# Patient Record
Sex: FEMALE | Race: WHITE | NOT HISPANIC OR LATINO | Employment: OTHER | URBAN - METROPOLITAN AREA
[De-identification: names, ages, dates, MRNs, and addresses within clinical notes are randomized per-mention and may not be internally consistent; named-entity substitution may affect disease eponyms.]

---

## 2021-05-03 ENCOUNTER — TRANSFERRED RECORDS (OUTPATIENT)
Dept: HEALTH INFORMATION MANAGEMENT | Facility: CLINIC | Age: 36
End: 2021-05-03

## 2021-06-01 ENCOUNTER — TRANSFERRED RECORDS (OUTPATIENT)
Dept: HEALTH INFORMATION MANAGEMENT | Facility: CLINIC | Age: 36
End: 2021-06-01

## 2021-06-02 ENCOUNTER — TELEPHONE (OUTPATIENT)
Dept: ENDOCRINOLOGY | Facility: CLINIC | Age: 36
End: 2021-06-02

## 2021-06-02 NOTE — TELEPHONE ENCOUNTER
[facility] Call Center    Phone Message    May a detailed message be left on voicemail: no     Reason for Call: Other: Patient has new diagnosis of hypothyroidism. Scheduled first avail on 08/16/2021 with Dr. Agudelo.  Patient will be sending records over from Dr. David.  Patient needs sooner appointment.  Please advise. Thank you.     Action Taken: Message routed to:  Adult Clinics: Endocrinology p [phone]    Travel Screening: Not Applicable

## 2021-06-08 NOTE — TELEPHONE ENCOUNTER
Patient called back and confirmed that she is not pregnant but she is post partum 3 months. Patient notes that her TSH was most recently 88.6 and 1 month before that it was 0.1. Patient states that she was increased to levothyroxine 50 mcg daily most recently.     Patient states that she was on levothyroxine throughout her whole pregnancy. Patient notes that she was diagnosed with hypothyroidism before she was pregnant. She notes that her TSH is all over the place and she was told she needs to get in ASAP.    Writer again checked [place]. No appointments in near future.     We are still awaiting records and unable to send to triage provider for review until records received.     Message was sent to referral team to obtain records and patient is also going to call [facility] today.      Purnima Bailey RN  Endocrine Care Coordinator  [facility]

## 2021-06-08 NOTE — TELEPHONE ENCOUNTER
Received records from [facility], records were faxed to [facility] for urgency review. There are no open appointments in the near future for Massiel Kaur.    Dunia Stuart, [facility]  Adult Endocrinology  Formerly [facility]

## 2021-06-08 NOTE — TELEPHONE ENCOUNTER
Clinic is still awaiting records. Message sent to referral team to obtain records. Appointment note says records will be coming from [facility] but no notation if patient is pregnant or not. Writer attempted to contact patient. No answer. Left voicemail for patient to contact our office.       Purnima Bailey RN  Endocrine Care Coordinator  [facility]

## 2021-06-08 NOTE — TELEPHONE ENCOUNTER
Writer received note from referral team that they do not obtain records. Writer called [facility] at [phone]. Writer requested that patient records and referral be faxed ASAP. Per [facility] staff, they will get referral and records faxed to clinic ASAP.      Purnima Bailey RN  Endocrine Care Coordinator  [facility]

## 2021-06-09 NOTE — TELEPHONE ENCOUNTER
CLINIC COORDINATOR SCHEDULING NOTES    CALL RESULT: LVM    APPT TYPE: VIDEO VISIT NEW / UMP NEW ENDOCRINE    PROVIDER: any provider accepting new endocrine patients    DATE APPT NEEDED: within the week    ADDITIONAL NOTES: OK to use any AMI/new 60 minute slot. Option on Saturday with  MED ENDO PROVIDER. Message Tona on Teams from 7-3 for help if needed. Only video visits available soon.

## 2021-06-09 NOTE — TELEPHONE ENCOUNTER
To schedulers : please schedule with consult service (or open new/MAI) within the week. This could be a virtual visit.      Sumi Foote MD  Endocrine triage

## 2021-06-10 NOTE — TELEPHONE ENCOUNTER
RECORDS RECEIVED FROM: external   DATE RECEIVED: 6.15.21    NOTES (FOR ALL VISITS) STATUS DETAILS   OFFICE NOTES from referring provider External  Dr. David-- [facility]   OFFICE NOTES from other specialist External  5.3.21 [facility]      ED NOTES na    OPERATIVE REPORT  (thyroid, pituitary, adrenal, parathyroid) na    MEDICATION LIST External  [facility]    IMAGING      DEXASCAN na    MRI (BRAIN) na    XR (Chest) na    CT (HEAD/NECK/CHEST/ABDOMEN) na    NUCLEAR  na    ULTRASOUND (HEAD/NECK) na    LABS     DIABETES: HBGA1C, CREATININE, FASTING LIPIDS, MICROALBUMIN URINE, POTASSIUM, TSH, T4    THYROID: TSH, T4, CBC, THYRODLONULIN, TOTAL T3, FREE T4, CALCITONIN, CEA Received/ce Cbc- 2.19.21   TSH/T4- 6.1.21   THYROID CASCADE 1.4.21   VITAMIN D 1.4.21

## 2021-06-14 RX ORDER — VITAMIN A ACETATE, .BETA.-CAROTENE, ASCORBIC ACID, CHOLECALCIFEROL, .ALPHA.-TOCOPHEROL ACETATE, DL-, THIAMINE MONONITRATE, RIBOFLAVIN, NIACINAMIDE, PYRIDOXINE HYDROCHLORIDE, FOLIC ACID, CYANOCOBALAMIN, CALCIUM CARBONATE, FERROUS FUMARATE, ZINC OXIDE, AND CUPRIC OXIDE 2000; 2000; 120; 400; 22; 1.84; 3; 20; 10; 1; 12; 200; 27; 25; 2 [IU]/1; [IU]/1; MG/1; [IU]/1; MG/1; MG/1; MG/1; MG/1; MG/1; MG/1; UG/1; MG/1; MG/1; MG/1; MG/1
1 TABLET ORAL
COMMUNITY

## 2021-06-14 RX ORDER — OXYCODONE AND ACETAMINOPHEN 5; 325 MG/1; MG/1
1 TABLET ORAL
COMMUNITY
Start: 2021-02-20

## 2021-06-14 RX ORDER — FLUTICASONE PROPIONATE 50 MCG
2 SPRAY, SUSPENSION (ML) NASAL
COMMUNITY

## 2021-06-14 RX ORDER — FERROUS SULFATE 325(65) MG
325 TABLET ORAL
COMMUNITY

## 2021-06-14 RX ORDER — IBUPROFEN 600 MG/1
600 TABLET, FILM COATED ORAL EVERY 6 HOURS
COMMUNITY
Start: 2021-02-20

## 2021-06-14 RX ORDER — SPIRONOLACT/HYDROCHLOROTHIAZID 25 MG-25MG
1 TABLET ORAL
COMMUNITY

## 2021-06-14 RX ORDER — CETIRIZINE HYDROCHLORIDE 10 MG/1
10 TABLET ORAL
COMMUNITY

## 2021-06-14 RX ORDER — SWAB
1 SWAB, NON-MEDICATED MISCELLANEOUS
COMMUNITY
Start: 2021-02-20

## 2021-06-14 RX ORDER — LEVOTHYROXINE SODIUM 50 UG/1
50 TABLET ORAL
COMMUNITY
End: 2021-06-15 | Stop reason: ALTCHOICE

## 2021-06-14 NOTE — PROGRESS NOTES
Hypothyroidism  Dinesh Ambrose CMA    Yamel is a 35 year old who is being evaluated via a billable video visit.      How would you like to obtain your AVS? MyChart  If the video visit is dropped, the invitation should be resent by: Text to cell phone: [phone]  Will anyone else be joining your video visit? No       [facility]ology [facility]      Yamel Valle is a 36 year old female who is being evaluated via a billable video visit.        Video Start Time: 9:30am  Video End Time: 10:05am    Yamel Valle complains of  chief complaint    Yamel Valle is a 35 year old female who is being evaluated via a billable video visit.        End[facility]inology [facility]    Consulting provider: Referred Self, MD  No address on file    Reason for consultation: Hypothyroidism    HPI:   Yamel Valle is a 35 year old female coming in in regards to abnormal thyroid function.  The patient was diagnosed with hypothyroidism around 2014.  She had been on and off levothyroxine since then.  The patient recently delivered her fifth child in end of February 2021.  During the last pregnancy she has been on 25 or 50 mcg of levothyroxine and at times also not on thyroid levothyroxine.  6 weeks postpartum TSH was suppressed with elevated free T4 at 2 while taking levothyroxine 50 mcg daily.  Levothyroxine was decreased to 25 mcg and follow-up labs still revealed a low TSH and slightly elevated free T4 on my May 3, 2021.  Upon this levothyroxine was discontinued.  Recheck of labs drawn first revealed a TSH of 88  IU with low free T4.    The patient after delivery felt more tired, however her and baby girl had not been sleeping well.  The patient failed quite well in May, however since June she has been exceedingly tired, felt bloated, cold hands cold feet.  She has lots of headaches.  She feels swollen.    Patient weight is 1 35 pounds, just slightly above her prepregnancy weight.    Patient denies heart racing  palpitations, no skin changes in hair or skin.  No tremors.    Delivery 2/19/21 birth baby girl    Been on levothyroxine patient takes levothyroxine in the morning with water, no food for 30 minutes, takes prenatal vitamins dinnertime    Family planning patient does not plan for affect pregnancy at this point    FH mother m and p aunts hypothyroid and on with hyperthyrodism      Current Problem List:   There is no problem list on file for this patient.        Assessment:  Middle age woman with thyrotoxicosis post partum and now elevated TSH and low free T4 consistent with post partum thyroiditis followed by hypothyroidism.  As TSH is quite elevated, correction to euthyroid state unlikely.  Would increase levothyroxine to 75 mcg daily, follow closely this TSH in 2 weeks to assure trend this going well, and in 6 weeks from now.  Follow-up with me thereafter    Plan:   Increase levothyroxine from 50 to 75 mcg daily  Blood work in 2 weeks for TSH and reflex free T4  Will contact patient's via [employer] and adjust as indicated  Repeat TSH in 6 weeks and follow-up with me thereafter    40 minutes spent on the date of the encounter doing chart review, review of test results, interpretation of tests, patient visit and documentation      Criselda Sharma MD  Endocrinology and Diabetes    Telephone contact:  [facility] [place] [phone]  [facility] [phone]        Past Medical and Past Surgical History:  No past medical history on file.    No past surgical history on file.    Medications:   Current Outpatient Medications   Medication Sig Dispense Refill     enoxaparin ANTICOAGULANT (LOVENOX) 60 MG/0.6ML syringe inject (1MG/KG)  by subcutaneous route  every 12 hours       enoxaparin ANTICOAGULANT (LOVENOX) 80 MG/0.8ML syringe Inject 80 mg Subcutaneous every 12 hours       ibuprofen (ADVIL/MOTRIN) 600 MG tablet Take 600 mg by mouth every 6 hours        oxyCODONE-acetaminophen (PERCOCET) 5-325 MG tablet Take 1 tablet by mouth       Prenatal Vit-Fe Fumarate-FA (PRENATAL MULTIVITAMIN  WITH IRON) 28-0.8 MG TABS Take 1 tablet by mouth       cetirizine (ZYRTEC) 10 MG tablet Take 10 mg by mouth       cholecalciferol 25 MCG (1000 UT) TABS Take 1,000 Units by mouth       ferrous sulfate (FEROSUL) 325 (65 Fe) MG tablet Take 325 mg by mouth       fluticasone (FLONASE) 50 MCG/ACT nasal spray 2 sprays       levothyroxine (SYNTHROID/LEVOTHROID) 50 MCG tablet Take 50 mcg by mouth       Multiple Vitamins-Minerals (VITRUM SENIOR) TABS Take 1 tablet by mouth       Prenatal Vit-Fe Fumarate-FA (PNV PRENATAL PLUS MULTIVITAMIN) 27-1 MG TABS per tablet Take 1 tablet by mouth         Allergies:   Not on File    Social History     Tobacco Use     Smoking status: Not on file   Substance Use Topics     Alcohol use: Not on file       No family history on file.    Review of Systems:  GENERAL: Negative  SKIN: Negative  HENT: Negative   EYE: Negative  HEART: Negative  RESPIRATORY: Negative   GI: Negative  : Negative  MSK: Negative  BLOOD/LYMPH: Negative  NEUROLOGIC: Negative   PSYCH: Negative    Physical Examination:  There were no vitals taken for this visit.  There is no height or weight on file to calculate BMI.    Wt Readings from Last 6 Encounters:   No data found for Wt     135 lbs lst, within 10 lbs     Reported vitals:  There were no vitals taken for this visit.   healthy, alert and no distress  PSYCH: Alert and oriented times 3; coherent speech, normal   rate and volume, able to articulate logical thoughts, able   to abstract reason, no tangential thoughts, no hallucinations   or delusions  Her affect is normal and pleasant  RESP: No cough, no audible wheezing, able to talk in full sentences  Remainder of exam unable to be completed due to telephone visits     Labs and Studies:   [facility]  6/1/21 TSH 88 freeT4 0.3  5/3/21 TSH <0.010 free T4 1.19  4/5/21 TSH <0.010 free  T4 2.29  1/4/21 TSH 2.46  11/9/20 TSH 1.72 free T4 0.91  10/12/20 TSH 2.5 free T4 0.96    12/7/20 VitB12 410 VitD 17 Ferritin 12  12/7/20 comp panel Creat 0.5 gluc 122 alb 3.0 remainder nl  CBC diff mostly nl  1/4/21 25OHVitD 32 Ferritin 20

## 2021-06-15 ENCOUNTER — VIRTUAL VISIT (OUTPATIENT)
Dept: ENDOCRINOLOGY | Facility: CLINIC | Age: 36
End: 2021-06-15
Payer: COMMERCIAL

## 2021-06-15 ENCOUNTER — PRE VISIT (OUTPATIENT)
Dept: ENDOCRINOLOGY | Facility: CLINIC | Age: 36
End: 2021-06-15

## 2021-06-15 DIAGNOSIS — E06.3 CHRONIC LYMPHOCYTIC THYROIDITIS: ICD-10-CM

## 2021-06-15 DIAGNOSIS — E06.3 HASHIMOTO'S THYROIDITIS: Primary | ICD-10-CM

## 2021-06-15 DIAGNOSIS — E06.3 HYPOTHYROIDISM DUE TO HASHIMOTO'S THYROIDITIS: ICD-10-CM

## 2021-06-15 PROCEDURE — 99203 OFFICE O/P NEW LOW 30 MIN: CPT | Mod: 95 | Performed by: INTERNAL MEDICINE

## 2021-06-15 RX ORDER — LEVOTHYROXINE SODIUM 75 UG/1
75 TABLET ORAL DAILY
Qty: 90 TABLET | Refills: 1 | Status: SHIPPED | OUTPATIENT
Start: 2021-06-15 | End: 2022-01-05

## 2021-06-15 NOTE — PATIENT INSTRUCTIONS
Increase levothyroxine to 75 mcg daily  I called in a new prescription to your pharmacy, you also can use up the 50 mcg by taking 1.5 tablets in the morning  Blood work in 2 weeks and in 6 weeks  Follow-up with me in 6 weeks    Consider to establish care with a primary care provider.  Once thyroid hormones are stabilized, a primary care provider could follow you for hypothyroidism and also take care a wide range of other medical issues if they would occur

## 2021-06-27 ENCOUNTER — HEALTH MAINTENANCE LETTER (OUTPATIENT)
Age: 36
End: 2021-06-27

## 2021-07-14 ENCOUNTER — LAB (OUTPATIENT)
Dept: LAB | Facility: OTHER | Age: 36
End: 2021-07-14
Payer: COMMERCIAL

## 2021-07-14 DIAGNOSIS — E06.3 HASHIMOTO'S THYROIDITIS: ICD-10-CM

## 2021-07-14 LAB — TSH SERPL DL<=0.005 MIU/L-ACNC: 0.84 MU/L (ref 0.4–4)

## 2021-07-14 PROCEDURE — 36415 COLL VENOUS BLD VENIPUNCTURE: CPT

## 2021-07-14 PROCEDURE — 84443 ASSAY THYROID STIM HORMONE: CPT | Performed by: INTERNAL MEDICINE

## 2021-08-14 ENCOUNTER — TELEPHONE (OUTPATIENT)
Dept: ENDOCRINOLOGY | Facility: CLINIC | Age: 36
End: 2021-08-14

## 2021-08-14 NOTE — TELEPHONE ENCOUNTER
[facility] to schedule follow up with Dr Sharma at her earliest convenience.

## 2021-08-16 ENCOUNTER — LAB (OUTPATIENT)
Dept: LAB | Facility: CLINIC | Age: 36
End: 2021-08-16
Payer: COMMERCIAL

## 2021-08-16 DIAGNOSIS — E06.3 HASHIMOTO'S THYROIDITIS: ICD-10-CM

## 2021-08-16 PROCEDURE — 84443 ASSAY THYROID STIM HORMONE: CPT

## 2021-08-16 PROCEDURE — 36415 COLL VENOUS BLD VENIPUNCTURE: CPT

## 2021-08-17 LAB — TSH SERPL DL<=0.005 MIU/L-ACNC: 1.71 MU/L (ref 0.4–4)

## 2021-10-17 ENCOUNTER — HEALTH MAINTENANCE LETTER (OUTPATIENT)
Age: 36
End: 2021-10-17

## 2022-01-03 DIAGNOSIS — E06.3 HASHIMOTO'S THYROIDITIS: ICD-10-CM

## 2022-01-05 RX ORDER — LEVOTHYROXINE SODIUM 75 UG/1
75 TABLET ORAL DAILY
Qty: 90 TABLET | Refills: 1 | Status: SHIPPED | OUTPATIENT
Start: 2022-01-05

## 2022-01-05 NOTE — TELEPHONE ENCOUNTER
Last Clinic Visit: 8/18/2021  [facility]    TSH   Date Value Ref Range Status   08/16/2021 1.71 0.40 - 4.00 mU/L Final

## 2022-10-03 ENCOUNTER — HEALTH MAINTENANCE LETTER (OUTPATIENT)
Age: 37
End: 2022-10-03

## 2023-05-20 ENCOUNTER — HEALTH MAINTENANCE LETTER (OUTPATIENT)
Age: 38
End: 2023-05-20

## 2024-03-09 ENCOUNTER — HEALTH MAINTENANCE LETTER (OUTPATIENT)
Age: 39
End: 2024-03-09

## 2024-10-05 ENCOUNTER — HEALTH MAINTENANCE LETTER (OUTPATIENT)
Age: 39
End: 2024-10-05